# Patient Record
Sex: MALE | Race: WHITE | NOT HISPANIC OR LATINO | Employment: OTHER | URBAN - NONMETROPOLITAN AREA
[De-identification: names, ages, dates, MRNs, and addresses within clinical notes are randomized per-mention and may not be internally consistent; named-entity substitution may affect disease eponyms.]

---

## 2023-12-07 ENCOUNTER — TRANSFERRED RECORDS (OUTPATIENT)
Dept: HEALTH INFORMATION MANAGEMENT | Facility: OTHER | Age: 73
End: 2023-12-07
Payer: MEDICARE

## 2023-12-07 LAB
CREATININE (EXTERNAL): 0.8 MG/DL (ref 0.66–1.25)
GFR ESTIMATED (EXTERNAL): >90 ML/MIN/1.73M2
GLUCOSE (EXTERNAL): 103 MG/DL (ref 74–100)
POTASSIUM (EXTERNAL): 4.5 MMOL/L (ref 3.4–5)

## 2023-12-08 ENCOUNTER — MEDICAL CORRESPONDENCE (OUTPATIENT)
Dept: HEALTH INFORMATION MANAGEMENT | Facility: OTHER | Age: 73
End: 2023-12-08
Payer: MEDICARE

## 2023-12-08 ENCOUNTER — TRANSFERRED RECORDS (OUTPATIENT)
Dept: HEALTH INFORMATION MANAGEMENT | Facility: OTHER | Age: 73
End: 2023-12-08
Payer: MEDICARE

## 2024-01-11 ENCOUNTER — OFFICE VISIT (OUTPATIENT)
Dept: CARDIOLOGY | Facility: OTHER | Age: 74
End: 2024-01-11
Attending: INTERNAL MEDICINE
Payer: MEDICARE

## 2024-01-11 VITALS
OXYGEN SATURATION: 97 % | TEMPERATURE: 97.8 F | SYSTOLIC BLOOD PRESSURE: 136 MMHG | WEIGHT: 201 LBS | HEART RATE: 72 BPM | BODY MASS INDEX: 28.14 KG/M2 | RESPIRATION RATE: 16 BRPM | HEIGHT: 71 IN | DIASTOLIC BLOOD PRESSURE: 80 MMHG

## 2024-01-11 DIAGNOSIS — M35.3 PMR (POLYMYALGIA RHEUMATICA) (H): ICD-10-CM

## 2024-01-11 DIAGNOSIS — R07.89 CHEST PRESSURE: Primary | ICD-10-CM

## 2024-01-11 DIAGNOSIS — I25.10 CORONARY ARTERY DISEASE INVOLVING NATIVE CORONARY ARTERY OF NATIVE HEART WITHOUT ANGINA PECTORIS: ICD-10-CM

## 2024-01-11 DIAGNOSIS — I48.0 PAROXYSMAL ATRIAL FIBRILLATION (H): ICD-10-CM

## 2024-01-11 DIAGNOSIS — E78.2 MIXED HYPERLIPIDEMIA: ICD-10-CM

## 2024-01-11 PROCEDURE — G0463 HOSPITAL OUTPT CLINIC VISIT: HCPCS | Mod: 25

## 2024-01-11 PROCEDURE — 93005 ELECTROCARDIOGRAM TRACING: CPT | Performed by: INTERNAL MEDICINE

## 2024-01-11 PROCEDURE — 93010 ELECTROCARDIOGRAM REPORT: CPT | Performed by: INTERNAL MEDICINE

## 2024-01-11 PROCEDURE — 99204 OFFICE O/P NEW MOD 45 MIN: CPT | Performed by: INTERNAL MEDICINE

## 2024-01-11 RX ORDER — NITROGLYCERIN 0.4 MG/1
TABLET SUBLINGUAL
Qty: 25 TABLET | Refills: 3 | Status: SHIPPED | OUTPATIENT
Start: 2024-01-11

## 2024-01-11 ASSESSMENT — PAIN SCALES - GENERAL: PAINLEVEL: NO PAIN (0)

## 2024-01-11 NOTE — NURSING NOTE
"Patient comes in for consult for chest pressure and shortness of breath.    Chief Complaint   Patient presents with    Consult For     Chest pressure       Initial /80 (BP Location: Right arm, Patient Position: Sitting, Cuff Size: Adult Large)   Pulse 72   Temp 97.8  F (36.6  C) (Temporal)   Resp 16   Ht 1.803 m (5' 11\")   Wt 91.2 kg (201 lb)   SpO2 97%   BMI 28.03 kg/m   Estimated body mass index is 28.03 kg/m  as calculated from the following:    Height as of this encounter: 1.803 m (5' 11\").    Weight as of this encounter: 91.2 kg (201 lb).  Meds Reconciled: complete  Pt is on Aspirin  Pt is on a Statin  PHQ and/or LORAINE reviewed. Pt referred to PCP/MH Provider as appropriate.    Leonila Paez LPN      "

## 2024-01-11 NOTE — PROGRESS NOTES
[facility] HEART CARE   CARDIOLOGY CONSULT     Juanito Ham   1950  [id number]    No Ref-Primary, Physician     Chief Complaint   Patient presents with    Consult For     Chest pressure          HPI:   Mr. Ham is a 73-year-old gentleman who is being seen by cardiology with concerns angina.  He was referred from [place].  He has been having exertional chest tightness for the last 3-4 months.  He reports a history of atrial fibrillation.  He is here for an evaluation.    Over the last 3-4 months, he has been having substernal chest tightness with activity.  He states he regularly walks in the woods around his property.  He describes the trails as rather flat.  Upon walking about half a mile, he will get a tightness to his chest and will be short of breath.  He has been fatigued.  He will stop and rest for 1-2 minutes and his symptoms are alleviated.  He has denied nausea, diaphoresis, vomiting, or dizziness.  He does not have any history of heart disease.  He has not had stenting or bypass.  He has 4 sisters and 2 brothers.  One of his sisters who is a year older recently had an MI and had CPR for 15 minutes on 12/24/2023.  She lives in [facility] was still is in the hospital at [place] at the time of consultation on 1/11/2024.  He himself has never smoked but has been exposed to secondhand smoke.  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  His parents also smoke around him.  He has never been diagnosed with diabetes.  He has had elevated blood pressures but not diagnosed with hypertension.  He has high cholesterol but currently on atorvastatin 40 mg daily.  BMI is 28.  He is active and not certain about his diet.  However, his symptoms are concerning.  I suggested a stress test which he was agreeable as he would like to figure out his situation    He also reports a history of atrial fibrillation.  He states he has some chest discomfort and significant dyspnea.  He has a [id number] score of up to 3.   Attention for hypertension, age, and vascular issues.  He presented to the hospital.  He believes he had EKG documentation of A-fib.  Since that time, he does not believe he has had a recurrence.  He has not had any symptoms to suggest he is back in A-fib.  He has not been on anticoagulant.  Has been on antiplatelets in the form of aspirin.  He has not had any palpitations or fluttering.  His EKG at time of consultation which was 1/14/2024, did not show evidence for A-fib.  Zio patch was ordered at the time of this consultation.    He also has a history of hyperlipidemia.  He is currently on atorvastatin 40 mg daily.    Patient reports he has PMR.  I do not believe he is being treated for this issue presently.      IMAGING RESULTS:   None on file.    CURRENT MEDICATIONS:   Prior to Admission medications    Not on File       ALLERGIES:   No Known Allergies     PAST MEDICAL HISTORY:   No past medical history on file.     PAST SURGICAL HISTORY:   No past surgical history on file.     FAMILY HISTORY:   No family history on file.     SOCIAL HISTORY:   Social History     Socioeconomic History    Marital status:           ROS:   CONSTITUTIONAL: No weight loss, fever, chills, weakness or fatigue.   CARDIOVASCULAR: Admits to chest pain with chest pressure and chest discomfort.  Has a history of palpitations but denies lower extremity edema.   RESPIRATORY: No shortness of breath, dyspnea upon exertion, cough or sputum production.   NEUROLOGICAL: No headache, lightheadedness, dizziness, syncope, ataxia or weakness.   HEMATOLOGIC: No anemia, bleeding or bruising.         PHYSICAL EXAM:   GENERAL: The patient is a well-developed, well-nourished, in no apparent distress. Alert and oriented x3.   HEART: Regular rate and rhythm, S1S2 present without murmur, rub or gallop.   LUNGS: Respirations regular and unlabored. Clear to auscultation.   EXTREMITIES: No peripheral edema present.   SKIN: No jaundice. No rashes or visible  skin lesions present.        LAB RESULTS:   Transferred Records on 12/07/2023   Component Date Value Ref Range Status    Potassium (External) 12/07/2023 4.5  3.4 - 5.0 mmol/L Final    Glucose (External) 12/07/2023 103 (H)  74 - 100 mg/dL Final    Creatinine (External) 12/07/2023 0.80  0.66 - 1.25 mg/dL Final    GFR Estimated (External) 12/07/2023 >90  mL/min/1.73m2 Final          ASSESSMENT:       ICD-10-CM    1. Chest pressure  R07.89 Echocardiogram Complete     NM MPI with Lyksiscan     EKG 12-lead, tracing only     nitroGLYcerin (NITROSTAT) 0.4 MG sublingual tablet      2. Paroxysmal atrial fibrillation (H)  I48.0 Leadless EKG Monitor 8 to 14 Days      3. Coronary artery disease involving native coronary artery of native heart without angina pectoris  I25.10 nitroGLYcerin (NITROSTAT) 0.4 MG sublingual tablet      4. Mixed hyperlipidemia  E78.2       5. PMR (polymyalgia rheumatica) (H24)  M35.3             PLAN:   1.  Exertional chest tightness: Concerning for angina.  No history of stenting or bypass.  Suggested a stress test which patient was agreeable.  Will have the stress test and if abnormal, did discuss cardiac catheterization.  Discussed risks and benefits of the stress test patient would be agreeable if needed.  He states understanding.  Will plan for an echocardiogram as well.  2.  A-fib: States has a history of documented A-fib in the past.  Does not believe he has had symptoms to suggest A-fib since.  I explained to him that A-fib does come back.  It does put him at high risk for stroke.  He has never been on anticoagulation but currently taking antiplatelet in the form of aspirin 325 mg once daily.  At this point, we will plan for Zio patch to determine if there is occult A-fib.  EKG today did not show A-fib.  3.  Hyperlipidemia: On atorvastatin 40 mg.  Continue.  4.  Follow-up after completion of stress test, echo, and Zio patch.    Total time spent on day of visit, including review of tests,  obtaining/reviewing separately obtained history, ordering medications/tests/procedures, communicating with PCP/consultants, and documenting in electronic medical record: 45 minutes.        Thank you for allowing me to participate in the care of your patient. Please do not hesitate to contact me if you have any questions.     Chandra Man, DO

## 2024-01-12 LAB
ATRIAL RATE - MUSE: 67 BPM
DIASTOLIC BLOOD PRESSURE - MUSE: NORMAL MMHG
INTERPRETATION ECG - MUSE: NORMAL
P AXIS - MUSE: -13 DEGREES
PR INTERVAL - MUSE: 146 MS
QRS DURATION - MUSE: 88 MS
QT - MUSE: 418 MS
QTC - MUSE: 441 MS
R AXIS - MUSE: -12 DEGREES
SYSTOLIC BLOOD PRESSURE - MUSE: NORMAL MMHG
T AXIS - MUSE: 6 DEGREES
VENTRICULAR RATE- MUSE: 67 BPM

## 2024-02-01 ENCOUNTER — TELEPHONE (OUTPATIENT)
Dept: CARDIOLOGY | Facility: OTHER | Age: 74
End: 2024-02-01
Payer: MEDICARE

## 2024-02-01 NOTE — TELEPHONE ENCOUNTER
Patient verified .  Reminder call for stress test with instructions given.  Emphasis on NO caffeine for 12 hours before the test.  Ok to have water.  No food for 4 hours before the test.   Test time changed  informed patient to arrive at diagnostic window at 0845 am on 24.    Patient verbalized understanding.

## 2024-02-02 DIAGNOSIS — I48.0 PAROXYSMAL ATRIAL FIBRILLATION (H): Primary | ICD-10-CM

## 2024-02-02 PROCEDURE — 93246 EXT ECG>7D<15D RECORDING: CPT | Performed by: INTERNAL MEDICINE

## 2024-02-06 ENCOUNTER — HOSPITAL ENCOUNTER (OUTPATIENT)
Dept: NUCLEAR MEDICINE | Facility: OTHER | Age: 74
Discharge: HOME | End: 2024-02-06
Attending: INTERNAL MEDICINE
Payer: MEDICARE

## 2024-02-06 ENCOUNTER — ALLIED HEALTH/NURSE VISIT (OUTPATIENT)
Dept: FAMILY MEDICINE | Facility: OTHER | Age: 74
End: 2024-02-06
Attending: INTERNAL MEDICINE
Payer: MEDICARE

## 2024-02-06 ENCOUNTER — HOSPITAL ENCOUNTER (OUTPATIENT)
Dept: CARDIOLOGY | Facility: OTHER | Age: 74
Discharge: HOME | End: 2024-02-06
Attending: INTERNAL MEDICINE
Payer: MEDICARE

## 2024-02-06 DIAGNOSIS — I48.0 PAROXYSMAL ATRIAL FIBRILLATION (H): ICD-10-CM

## 2024-02-06 DIAGNOSIS — R07.89 CHEST PRESSURE: ICD-10-CM

## 2024-02-06 DIAGNOSIS — I48.0 PAROXYSMAL ATRIAL FIBRILLATION (H): Primary | ICD-10-CM

## 2024-02-06 LAB
CV BLOOD PRESSURE: 70 MMHG
CV STRESS MAX HR HE: 100
LVEF ECHO: NORMAL
NUC STRESS EJECTION FRACTION: 71 %
RATE PRESSURE PRODUCT: NORMAL
STRESS ECHO BASELINE DIASTOLIC HE: 79
STRESS ECHO BASELINE HR: 60 BPM
STRESS ECHO BASELINE SYSTOLIC BP: 164
STRESS ECHO CALCULATED PERCENT HR: 68 %
STRESS ECHO LAST STRESS DIASTOLIC BP: 75
STRESS ECHO LAST STRESS SYSTOLIC BP: 132
STRESS ECHO POST ESTIMATED WORKLOAD: 1 METS
STRESS ECHO TARGET HR: 148

## 2024-02-06 PROCEDURE — 93246 EXT ECG>7D<15D RECORDING: CPT | Performed by: INTERNAL MEDICINE

## 2024-02-06 PROCEDURE — 93306 TTE W/DOPPLER COMPLETE: CPT | Mod: 26 | Performed by: INTERNAL MEDICINE

## 2024-02-06 PROCEDURE — 343N000001 HC RX 343: Performed by: INTERNAL MEDICINE

## 2024-02-06 PROCEDURE — A9500 TC99M SESTAMIBI: HCPCS | Performed by: INTERNAL MEDICINE

## 2024-02-06 PROCEDURE — 93017 CV STRESS TEST TRACING ONLY: CPT

## 2024-02-06 PROCEDURE — 250N000011 HC RX IP 250 OP 636: Mod: JZ | Performed by: INTERNAL MEDICINE

## 2024-02-06 PROCEDURE — 93018 CV STRESS TEST I&R ONLY: CPT | Performed by: INTERNAL MEDICINE

## 2024-02-06 PROCEDURE — 78452 HT MUSCLE IMAGE SPECT MULT: CPT | Mod: MF

## 2024-02-06 PROCEDURE — 999N000157 HC STATISTIC RCP TIME EA 10 MIN

## 2024-02-06 PROCEDURE — 93016 CV STRESS TEST SUPVJ ONLY: CPT | Performed by: INTERNAL MEDICINE

## 2024-02-06 PROCEDURE — 93306 TTE W/DOPPLER COMPLETE: CPT

## 2024-02-06 RX ORDER — AMINOPHYLLINE 25 MG/ML
50-200 INJECTION, SOLUTION INTRAVENOUS
Status: DISCONTINUED | OUTPATIENT
Start: 2024-02-06 | End: 2024-02-07 | Stop reason: HOSPADM

## 2024-02-06 RX ORDER — REGADENOSON 0.08 MG/ML
0.4 INJECTION, SOLUTION INTRAVENOUS ONCE
Status: COMPLETED | OUTPATIENT
Start: 2024-02-06 | End: 2024-02-06

## 2024-02-06 RX ORDER — ASPIRIN 325 MG
325 TABLET, DELAYED RELEASE (ENTERIC COATED) ORAL DAILY
COMMUNITY

## 2024-02-06 RX ADMIN — KIT FOR THE PREPARATION OF TECHNETIUM TC99M SESTAMIBI 8.75 MILLICURIE: 1 INJECTION, POWDER, LYOPHILIZED, FOR SOLUTION PARENTERAL at 09:00

## 2024-02-06 RX ADMIN — REGADENOSON 0.4 MG: 0.08 INJECTION, SOLUTION INTRAVENOUS at 10:19

## 2024-02-06 RX ADMIN — KIT FOR THE PREPARATION OF TECHNETIUM TC99M SESTAMIBI 34.9 MILLICURIE: 1 INJECTION, POWDER, LYOPHILIZED, FOR SOLUTION PARENTERAL at 10:20

## 2024-02-06 NOTE — LETTER
March 11, 2024      Juanito Ham  [id number]  [place] [address]        Dear ,    We are writing to inform you of your test results.    Here is a copy of your Zio patch results.  As you recall, we are looking for something called atrial fibrillation which is an irregular heart rate.  I am happy report, your study did not have any atrial fibrillation.  You did not have any life-threatening rhythms.  You had some extra beats/early beats and runs of extra/early beats from the upper and lower chambers of the heart.  These are common and generally do not require treatment unless significantly bothersome.  If significantly bothersome, adjustments or changes can be made to your medications.  Otherwise, nothing more would need to be done.    Resulted Orders   ZIO PATCH 8-14 DAYS (additional cost to patient)    Narrative    Zio XT patch report on Juanito Ham.  Ordered secondary to A-fib.     Worn for 13 days and 19 hr's.  After removing artifact, total time was 13   days and 16 hr's. Placed on 2/6/2024 at 12:41 PM and completed on   2/20/2024 at 7:35 AM.     Underlying rhythm was sinus.     Hrt rate ranged from 50 bpm, maximum heart rate of 179 bmp, averaging 67   bmp.     No significant bradycardia, pauses, Mobitz type II or 3rd degree heart   block.    No atrial fibrillation on this study.     x13 triggered events and x7 diary entries.  These corresponded to SVE and   sinus rhythm.     x0 runs of VT.       x24 runs of SVT lasting up to 11 beats with a maximum heart rate of 179   bmp.     Rare, <1% of PAC's, atrial couplets, atrial triplets, PVC's and   ventricular couplets.     0 episodes of ventricular bigeminy.    0 episodes of ventricular trigeminy.       If you have any questions or concerns, please call the clinic at the number listed above.       Sincerely,      Chandra Man, DO

## 2024-02-06 NOTE — PROGRESS NOTES
0900 The patient arrived for a Lexiscan Cardiolite stress test.  The procedure, risks, and benefits were discussed with the patient, and the consent was signed.  A saline lock was started,and the Cardiolite was injected by Nuclear Medicine.  The patient was taken to the waiting area, to await resting images at 0920.  1015 The patient returned from Nuclear Medicine and was prepped for the stress test.   Dr. Pedraza arrived, and the patient was administered the Lexiscan per procedure.  The patient tolerated the procedure.  He was given a snack and taken to Nuclear Medicine in stable condition for stress images.  The saline lock will be removed by Nuclear Medicine for proper disposal.  The patient was instructed that the ordering MD will call with results in one to two days.  Please see the chart for the complete test results.

## 2024-02-06 NOTE — PROGRESS NOTES
Patient arrived (date)02/06/2024 (time)1230 for a 14 day Zio monitor per (provider name) [facility] for Paroxysmal A-Fin (Dx).  Patient's skin was prepped per protocol.  Zio monitor was placed.  Patient verbalized understanding of instructions and plan of care.  Patient was given Zio diary and prepaid .  Respiratory Therapist reviewed Zio Patch placement process and asked patient if they are comfortable proceeding with placement.  Patient (is or is not) is comfortable proceeding.  Patient (would or would not) would not like an employee of same gender to be (present or to place) present or to place the Zio Patch.  Documentation of Zio Patch placement site (Bleeding or Not Bleeding) Not Bleeding.  If there is bleeding documentation of why.  N/A  Documentation of accommodations made for patient.  No     Cristiane Harding RT

## 2024-03-06 PROCEDURE — 93248 EXT ECG>7D<15D REV&INTERPJ: CPT | Performed by: INTERNAL MEDICINE

## 2024-03-11 NOTE — PROGRESS NOTES
[facility] HEART CARE   CARDIOLOGY PROGRESS NOTE     Chief Complaint   Patient presents with    Follow Up     tests          Diagnosis:  1.  Chest discomfort.   -Normal stress test on 2/6/2024.  2.  A-fib.   -None on monitor from 2/6/2024.  3.  Hyperlipidemia-????   -Lipitor 40 mg daily.         Assessment/Plan:    1.  Exertional chest tightness: States will get a pressure with shortness of breath at the onset of activity.  Lasts 30 to 60 minutes.  He will stop and rest and symptoms will go away then he is able to walk/exercise up to an hour with well reoccurrence of symptoms.  Symptoms are somewhat atypical but yet concerning.  Patient was set up for stress test.  We reviewed a stress test today.  Stress test was normal.  Patient reassured.  Also, we discussed his echocardiogram which was normal.  2.  A-fib: States has a history of documented A-fib in the past.  Does not believe he has had symptoms to suggest A-fib since.  I explained to him that A-fib does come back.  It does put him at high risk for stroke.  He has never been on anticoagulation but currently taking antiplatelet in the form of aspirin 325 mg once daily.  He was set up for Zio patch.  He wore the Zio patch for 13 days and 19 hours.  This monitor did not show any evidence for A-fib.  Patient reassured but it was explained he could have ongoing episodes of A-fib but were just not seen on the monitor.  If in the future, he has palpitations or fluttering, he should let us know and we should reevaluate the situation and help prevent a stroke.  For now, no medical changes.  3.  Hyperlipidemia: On atorvastatin 40 mg.  Continue.  4.  Follow-up in the future on as-needed basis.      Interval history:  See above.      HPI:    Mr. Ham is being seen by cardiology with concerns angina.  He was referred from [place].  He has been having exertional chest tightness for the last 3-4 months.  He reports a history of atrial fibrillation.  He is here for an  evaluation.     Over the last 3-4 months, he has been having substernal chest tightness with activity.  He states he regularly walks in the woods around his property.  He describes the trails as rather flat.  Upon walking about half a mile, he will get a tightness to his chest and will be short of breath.  He has been fatigued.  He will stop and rest for 1-2 minutes and his symptoms are alleviated.  He has denied nausea, diaphoresis, vomiting, or dizziness.  He does not have any history of heart disease.  He has not had stenting or bypass.  He has 4 sisters and 2 brothers.  One of his sisters who is a year older recently had an MI and had CPR for 15 minutes on 12/24/2023.  She lives in [facility] [place] was still is in the hospital at [place] at the time of consultation on 1/11/2024.  He himself has never smoked but has been exposed to secondhand smoke.  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  His parents also smoke around him.  He has never been diagnosed with diabetes.  He has had elevated blood pressures but not diagnosed with hypertension.  He has high cholesterol but currently on atorvastatin 40 mg daily.  BMI is 28.  He is active and not certain about his diet.  However, his symptoms are concerning.  I suggested a stress test which he was agreeable as he would like to figure out his situation     He also reports a history of atrial fibrillation.  He states he has some chest discomfort and significant dyspnea.  He has a [id number] score of up to 3.  Attention for hypertension, age, and vascular issues.  He presented to the hospital.  He believes he had EKG documentation of A-fib.  Since that time, he does not believe he has had a recurrence.  He has not had any symptoms to suggest he is back in A-fib.  He has not been on anticoagulant.  Has been on antiplatelets in the form of aspirin.  He has not had any palpitations or fluttering.  His EKG at time of consultation which was 1/14/2024, did not show evidence for  Dileep.  Zio patch was ordered at the time of this consultation.     He also has a history of hyperlipidemia.  He is currently on atorvastatin 40 mg daily.     Patient reports he has PMR.  I do not believe he is being treated for this issue presently.         Relevant testing:  Zio patch on 2/6/2024:  Worn for 13 days and 19 hr's.  After removing artifact, total time was 13 days and 16 hr's. Placed on 2/6/2024 at 12:41 PM and completed on 2/20/2024 at 7:35 AM.  Underlying rhythm was sinus.  Hrt rate ranged from 50 bpm, maximum heart rate of 179 bmp, averaging 67 bmp.  No significant bradycardia, pauses, Mobitz type II or 3rd degree heart block.  No atrial fibrillation on this study.  x13 triggered events and x7 diary entries.  These corresponded to SVE and sinus rhythm.  x0 runs of VT.    x24 runs of SVT lasting up to 11 beats with a maximum heart rate of 179 bmp.  Rare, <1% of PAC's, atrial couplets, atrial triplets, PVC's and ventricular couplets.  0 episodes of ventricular bigeminy.  0 episodes of ventricular trigeminy.    Stress test on 2/6/2024:    The nuclear stress test is negative for inducible myocardial ischemia or infarction.     Left ventricular function is normal.     The left ventricular ejection fraction at rest is 70%.  The left   ventricular ejection fraction at stress is 71%.     There is no prior study for comparison.    Echocardiogram in 2/6/2024:  Global and regional left ventricular function is normal with an EF of 55-60%.   Left ventricular diastolic function is normal.  Global right ventricular function is normal.  No significant valvular abnormalities present.  IVC diameter <2.1 cm collapsing >50% with sniff suggests a normal RA pressure of 3 mmHg.  No pericardial effusion is present.  There is no prior study for direct comparison.        ICD-10-CM    1. Chest pressure  R07.89       2. Palpitations  R00.2       3. Paroxysmal atrial fibrillation (H)  I48.0           No past medical history on  file.    No past surgical history on file.    Allergies   Allergen Reactions    Clindamycin Other (See Comments)     colitis       Current Outpatient Medications   Medication Sig Dispense Refill    atorvastatin (LIPITOR) 40 MG tablet Take 40 mg by mouth at bedtime      metoprolol succinate ER (TOPROL XL) 50 MG 24 hr tablet Take 1 tablet by mouth daily at 2 pm      aspirin (ASA) 325 MG EC tablet Take 325 mg by mouth daily      nitroGLYcerin (NITROSTAT) 0.4 MG sublingual tablet For chest pain place 1 tablet under the tongue every 5 minutes for 3 doses. If symptoms persist 5 minutes after 1st dose call 911. 25 tablet 3       Social History     Socioeconomic History    Marital status:      Spouse name: Not on file    Number of children: Not on file    Years of education: Not on file    Highest education level: Not on file   Occupational History    Not on file   Tobacco Use    Smoking status: Never     Passive exposure: Past    Smokeless tobacco: Never   Substance and Sexual Activity    Alcohol use: Yes     Comment: beer 3 times a week    Drug use: Not Currently    Sexual activity: Not on file   Other Topics Concern    Not on file   Social History Narrative    Not on file     Social Determinants of Health     Financial Resource Strain: Not on file   Food Insecurity: Not on file   Transportation Needs: Not on file   Physical Activity: Not on file   Stress: Not on file   Social Connections: Not on file   Interpersonal Safety: Low Risk  (3/12/2024)    Interpersonal Safety     Do you feel physically and emotionally safe where you currently live?: Yes     Within the past 12 months, have you been hit, slapped, kicked or otherwise physically hurt by someone?: No     Within the past 12 months, have you been humiliated or emotionally abused in other ways by your partner or ex-partner?: No   Housing Stability: Not on file       LAB RESULTS:   No visits with results within 2 Month(s) from this visit.   Latest known visit  "with results is:   Office Visit on 01/11/2024   Component Date Value Ref Range Status    Ventricular Rate 01/11/2024 67  BPM Final    Atrial Rate 01/11/2024 67  BPM Final    NH Interval 01/11/2024 146  ms Final    QRS Duration 01/11/2024 88  ms Final    QT 01/11/2024 418  ms Final    QTc 01/11/2024 441  ms Final    P Axis 01/11/2024 -13  degrees Final    R AXIS 01/11/2024 -12  degrees Final    T Axis 01/11/2024 6  degrees Final    Interpretation ECG 01/11/2024    Final                    Value:Sinus rhythm  Left axis deviation  No previous ECGs available  Confirmed by MD DEWEY, JERSON ([id number]) on 1/12/2024 9:04:44 AM          Review of systems: Negative except that which was noted in the HPI.    Physical examination:  /80 (BP Location: Right arm, Patient Position: Sitting, Cuff Size: Adult Large)   Pulse 70   Temp 97.6  F (36.4  C) (Temporal)   Resp 16   Ht 1.8 m (5' 10.87\")   Wt 90.7 kg (200 lb)   SpO2 99%   BMI 28.00 kg/m      GENERAL APPEARANCE: healthy, alert and no distress  CHEST: lungs clear to auscultation - no rales, rhonchi or wheezes, no use of accessory muscles, no retractions, respirations are unlabored, normal respiratory rate  CARDIOVASCULAR: regular rhythm, normal S1 with physiologic split S2, no S3 or S4 and no murmur, click or rub  EXTREMITIES: no clubbing, cyanosis or edema.    Total time spent on day of visit, including review of tests, obtaining/reviewing separately obtained history, ordering medications/tests/procedures, communicating with PCP/consultants, and documenting in electronic medical record: 20 minutes.              Thank you for allowing me to participate in the care of your patient. Please do not hesitate to contact me if you have any questions.     Jerson Man, DO          "

## 2024-03-12 ENCOUNTER — OFFICE VISIT (OUTPATIENT)
Dept: CARDIOLOGY | Facility: OTHER | Age: 74
End: 2024-03-12
Attending: INTERNAL MEDICINE
Payer: MEDICARE

## 2024-03-12 VITALS
HEIGHT: 71 IN | WEIGHT: 200 LBS | SYSTOLIC BLOOD PRESSURE: 130 MMHG | BODY MASS INDEX: 28 KG/M2 | DIASTOLIC BLOOD PRESSURE: 80 MMHG | HEART RATE: 70 BPM | RESPIRATION RATE: 16 BRPM | OXYGEN SATURATION: 99 % | TEMPERATURE: 97.6 F

## 2024-03-12 DIAGNOSIS — R00.2 PALPITATIONS: ICD-10-CM

## 2024-03-12 DIAGNOSIS — R07.89 CHEST PRESSURE: Primary | ICD-10-CM

## 2024-03-12 DIAGNOSIS — I48.0 PAROXYSMAL ATRIAL FIBRILLATION (H): ICD-10-CM

## 2024-03-12 PROCEDURE — 99213 OFFICE O/P EST LOW 20 MIN: CPT | Performed by: INTERNAL MEDICINE

## 2024-03-12 PROCEDURE — G0463 HOSPITAL OUTPT CLINIC VISIT: HCPCS

## 2024-03-12 RX ORDER — METOPROLOL SUCCINATE 50 MG/1
1 TABLET, EXTENDED RELEASE ORAL
COMMUNITY
Start: 2024-03-04

## 2024-03-12 RX ORDER — ATORVASTATIN CALCIUM 40 MG/1
40 TABLET, FILM COATED ORAL AT BEDTIME
COMMUNITY
Start: 2023-12-14

## 2024-03-12 ASSESSMENT — PAIN SCALES - GENERAL: PAINLEVEL: NO PAIN (1)

## 2024-03-12 NOTE — NURSING NOTE
"Patient comes in for follow up on tests.    Chief Complaint   Patient presents with    Follow Up     tests       Initial /80 (BP Location: Right arm, Patient Position: Sitting, Cuff Size: Adult Large)   Pulse 70   Temp 97.6  F (36.4  C) (Temporal)   Resp 16   Ht 1.8 m (5' 10.87\")   Wt 90.7 kg (200 lb)   SpO2 99%   BMI 28.00 kg/m   Estimated body mass index is 28 kg/m  as calculated from the following:    Height as of this encounter: 1.8 m (5' 10.87\").    Weight as of this encounter: 90.7 kg (200 lb).  Meds Reconciled: complete  Pt is on Aspirin  Pt is on a Statin  PHQ and/or LORAINE reviewed. Pt referred to PCP/MH Provider as appropriate.    Leonila Paez LPN      "

## (undated) RX ORDER — REGADENOSON 0.08 MG/ML
INJECTION, SOLUTION INTRAVENOUS
Status: DISPENSED
Start: 2024-02-06